# Patient Record
Sex: MALE | Race: WHITE | Employment: FULL TIME | URBAN - METROPOLITAN AREA
[De-identification: names, ages, dates, MRNs, and addresses within clinical notes are randomized per-mention and may not be internally consistent; named-entity substitution may affect disease eponyms.]

---

## 2020-05-18 ENCOUNTER — HOSPITAL ENCOUNTER (OUTPATIENT)
Dept: GENERAL RADIOLOGY | Age: 40
Discharge: HOME | End: 2020-05-20
Payer: COMMERCIAL

## 2020-05-18 ENCOUNTER — HOSPITAL ENCOUNTER (OUTPATIENT)
Age: 40
Discharge: HOME | End: 2020-05-18
Payer: COMMERCIAL

## 2020-05-18 PROCEDURE — 73030 X-RAY EXAM OF SHOULDER: CPT

## 2020-05-18 PROCEDURE — 73110 X-RAY EXAM OF WRIST: CPT

## 2020-05-18 PROCEDURE — 73090 X-RAY EXAM OF FOREARM: CPT

## 2020-05-18 PROCEDURE — 73080 X-RAY EXAM OF ELBOW: CPT

## 2020-05-22 ENCOUNTER — OFFICE VISIT (OUTPATIENT)
Dept: ORTHOPEDIC SURGERY | Age: 40
End: 2020-05-22
Payer: COMMERCIAL

## 2020-05-22 VITALS — BODY MASS INDEX: 28 KG/M2 | WEIGHT: 200 LBS | HEIGHT: 71 IN

## 2020-05-22 PROCEDURE — 99203 OFFICE O/P NEW LOW 30 MIN: CPT | Performed by: ORTHOPAEDIC SURGERY

## 2020-05-22 PROCEDURE — 24650 CLTX RDL HEAD/NCK FX WO MNPJ: CPT | Performed by: ORTHOPAEDIC SURGERY

## 2020-05-24 PROBLEM — S52.125A CLOSED NONDISPLACED FRACTURE OF HEAD OF LEFT RADIUS: Status: ACTIVE | Noted: 2020-05-24

## 2025-02-25 ENCOUNTER — APPOINTMENT (OUTPATIENT)
Dept: GENERAL RADIOLOGY | Age: 45
End: 2025-02-25

## 2025-02-25 ENCOUNTER — HOSPITAL ENCOUNTER (EMERGENCY)
Age: 45
Discharge: HOME | End: 2025-02-25
Attending: EMERGENCY MEDICINE

## 2025-02-25 VITALS
RESPIRATION RATE: 18 BRPM | HEART RATE: 94 BPM | OXYGEN SATURATION: 99 % | HEIGHT: 71 IN | WEIGHT: 200 LBS | BODY MASS INDEX: 28 KG/M2 | TEMPERATURE: 98.5 F | DIASTOLIC BLOOD PRESSURE: 91 MMHG | SYSTOLIC BLOOD PRESSURE: 145 MMHG

## 2025-02-25 DIAGNOSIS — M25.551 RIGHT HIP PAIN: Primary | ICD-10-CM

## 2025-02-25 PROCEDURE — 99284 EMERGENCY DEPT VISIT MOD MDM: CPT

## 2025-02-25 PROCEDURE — 96372 THER/PROPH/DIAG INJ SC/IM: CPT

## 2025-02-25 PROCEDURE — 73502 X-RAY EXAM HIP UNI 2-3 VIEWS: CPT

## 2025-02-25 PROCEDURE — 6360000002 HC RX W HCPCS

## 2025-02-25 RX ORDER — KETOROLAC TROMETHAMINE 30 MG/ML
30 INJECTION, SOLUTION INTRAMUSCULAR; INTRAVENOUS ONCE
Status: COMPLETED | OUTPATIENT
Start: 2025-02-25 | End: 2025-02-25

## 2025-02-25 RX ADMIN — KETOROLAC TROMETHAMINE 30 MG: 30 INJECTION, SOLUTION INTRAMUSCULAR at 17:10

## 2025-02-25 ASSESSMENT — LIFESTYLE VARIABLES
HOW OFTEN DO YOU HAVE A DRINK CONTAINING ALCOHOL: NEVER
HOW MANY STANDARD DRINKS CONTAINING ALCOHOL DO YOU HAVE ON A TYPICAL DAY: PATIENT DOES NOT DRINK

## 2025-02-25 NOTE — ED NOTES
Mode of arrival (squad #, walk in, police, etc) : Walk-in        Chief complaint(s): Right hip pain        Arrival Note (brief scenario, treatment PTA, etc).: Pt arrived to the ED with c/o right hip pain that has been going on for 2 weeks. Pt states that over a year ago he injured his hip and then he also works for [employer]. Pt denies injury but states that he does lift heavy things a lot. Pt states ibuprofen is helping.         C= \"Have you ever felt that you should Cut down on your drinking?\"  No  A= \"Have people Annoyed you by criticizing your drinking?\"  No  G= \"Have you ever felt bad or Guilty about your drinking?\"  No  E= \"Have you ever had a drink as an Eye-opener first thing in the morning to steady your nerves or to help a hangover?\"  No      Deferred []      Reason for deferring: N/A    *If yes to two or more: probable alcohol abuse.*

## 2025-02-25 NOTE — ED PROVIDER NOTES
EMERGENCY DEPARTMENT ENCOUNTER   ATTENDING ATTESTATION     Pt Name: Roberto Sifuentes  MRN: [id number]  Birthdate 1980  Date of evaluation: 2/25/25       Roberto Sifuentes is a 44 y.o. male who presents with Hip Pain (Right)      MDM:   Right hip pain for 2 weeks.  No traumas falls or injuries at that time.  He works on his feet a lot.  No rashes.  Check an x-ray suspect hip strain or arthritis.  Do not suspect infection or DVT or neurovascular injury.  PT 2+ in his right ankle.  He is ambulating.    Vitals:   Vitals:    02/25/25 1609   BP: (!) 145/91   Pulse: 94   Resp: 18   Temp: 98.5 °F (36.9 °C)   SpO2: 99%   Weight: 90.7 kg (200 lb)   Height: 1.803 m (5' 11\")         I personally saw and examined the patient. I have reviewed and agree with the resident's findings, including all diagnostic interpretations and treatment plan as written. I was present for the key portions of any procedures performed and the inclusive time noted for any critical care statement.    Enoc Miller MD  Attending Emergency Physician            Enoc Miller MD  02/25/25 9208    
known allergies.    Home Medications:  Prior to Admission medications    Medication Sig Start Date End Date Taking? Authorizing Provider   ibuprofen (ADVIL;MOTRIN) 800 MG tablet Take 1 tablet by mouth every 8 hours as needed for Pain 1/22/17   Nic Wood MD   traMADol (ULTRAM) 50 MG tablet Take 50 mg by mouth every 6 hours as needed for Pain States took 4 tabs 0630 this am without any relief    Provider, MD Storm         REVIEW OF SYSTEMS       Review of Systems   Musculoskeletal:  Positive for gait problem.       PHYSICAL EXAM      INITIAL VITALS:   BP (!) 145/91   Pulse 94   Temp 98.5 °F (36.9 °C)   Resp 18   Ht 1.803 m (5' 11\")   Wt 90.7 kg (200 lb)   SpO2 99%   BMI 27.89 kg/m²     Physical Exam  Vitals and nursing note reviewed.   Constitutional:       General: He is not in acute distress.     Appearance: He is well-developed. He is not diaphoretic.   HENT:      Head: Normocephalic and atraumatic.      Nose: Nose normal.   Eyes:      Pupils: Pupils are equal, round, and reactive to light.   Cardiovascular:      Rate and Rhythm: Normal rate and regular rhythm.      Pulses:           Radial pulses are 2+ on the right side and 2+ on the left side.        Dorsalis pedis pulses are 2+ on the right side and 2+ on the left side.      Heart sounds: Normal heart sounds.   Pulmonary:      Effort: Pulmonary effort is normal. No respiratory distress.      Breath sounds: Normal breath sounds.   Abdominal:      General: Bowel sounds are normal.      Palpations: Abdomen is soft.      Tenderness: There is no abdominal tenderness.   Musculoskeletal:         General: Tenderness present. Normal range of motion.   Skin:     General: Skin is warm and dry.      Findings: No rash.   Neurological:      Mental Status: He is alert and oriented to person, place, and time.           DDX/DIAGNOSTIC RESULTS / EMERGENCY DEPARTMENT COURSE / MDM     Medical Decision Making  Amount and/or Complexity of Data Reviewed  Radiology:

## 2025-02-25 NOTE — DISCHARGE INSTRUCTIONS
You are seen in the ED for right hip pain.  X-ray imaging shows chronic degenerative changes.    Please follow-up with orthopedic surgery in the outpatient setting.  Information for such a outpatient appointment has been provided.

## 2025-03-03 ENCOUNTER — OFFICE VISIT (OUTPATIENT)
Dept: ORTHOPEDIC SURGERY | Age: 45
End: 2025-03-03

## 2025-03-03 VITALS — RESPIRATION RATE: 14 BRPM | HEIGHT: 71 IN | WEIGHT: 200 LBS | BODY MASS INDEX: 28 KG/M2

## 2025-03-03 DIAGNOSIS — M25.551 RIGHT HIP PAIN: Primary | ICD-10-CM

## 2025-03-03 DIAGNOSIS — M16.11 ARTHRITIS OF RIGHT HIP: ICD-10-CM

## 2025-03-03 PROCEDURE — 99203 OFFICE O/P NEW LOW 30 MIN: CPT

## 2025-03-03 RX ORDER — MELOXICAM 15 MG/1
15 TABLET ORAL DAILY
Qty: 30 TABLET | Refills: 1 | Status: SHIPPED | OUTPATIENT
Start: 2025-03-03

## 2025-03-03 ASSESSMENT — ENCOUNTER SYMPTOMS: COLOR CHANGE: 0

## 2025-03-03 NOTE — PROGRESS NOTES
[facility] PHYSICIANS [facility], [facility] ORTHOPEDICS AND SPORTS MEDICINE  [address]  [address]  [place] [id number]  Dept: [phone]    Ambulatory Orthopedic New Patient Visit      CHIEF COMPLAINT:    Chief Complaint   Patient presents with    Hip Pain     right       HISTORY OF PRESENT ILLNESS:      History of Present Illness  The patient is a 44-year-old male who presents for evaluation of right hip pain.    He sought emergency care on 02/25/2025 due to persistent right hip pain, which he attributes to his physically demanding job as a  at [employer]. His job involves lifting heavy packages, ascending stairs, and loading trucks, activities that he believes have contributed to his discomfort. The pain, which has been present for several weeks, is exacerbated by weight-bearing activities such as stepping, and radiates from his buttock down the posterior aspect of his hip. He also reports frequent groin pain. Despite a fall incident approximately 1 to 2 weeks prior to the onset of his symptoms, he does not believe this event directly caused his current condition. He recalls a previous fall from a wooden ladder 2 years ago, during which his leg became entangled in the ladder, but he is uncertain if this incident is related to his current symptoms. He is having a difficult time getting into the car, putting his sock on, and stepping over objects due to the hip pain. He reports weakness in his right leg, which has been progressively worsening since he started his new job in 01/2025. He has been informed of degenerative changes in his hip and is concerned about the possibility of requiring a hip replacement in the future. He has been managing his pain with ibuprofen but has limited its use due to associated constipation. He reports no numbness or tingling in his hip or leg, but notes a long-standing numb spot on his leg. He has no history of